# Patient Record
Sex: MALE | Race: WHITE | NOT HISPANIC OR LATINO | Employment: OTHER | URBAN - METROPOLITAN AREA
[De-identification: names, ages, dates, MRNs, and addresses within clinical notes are randomized per-mention and may not be internally consistent; named-entity substitution may affect disease eponyms.]

---

## 2017-08-24 ENCOUNTER — TELEPHONE (OUTPATIENT)
Dept: CARDIAC SURGERY | Facility: CLINIC | Age: 72
End: 2017-08-24

## 2017-08-24 NOTE — TELEPHONE ENCOUNTER
Received call from Marce, calling from Dr. Tinoco's office. She was following up on a 1 yr f/u w/Dr. Degroot. Several attempts have been made to contact this pt in regards to scheduling a test/follow-up for Dr. Degroot. The patient has not reached out to us. The phone number listed for Mr. Powers is not working. I have tried calling his emergecy contact, his daughter, and all attempts have failed. I will be sending a letter in the mail to reach out the patient that way.